# Patient Record
(demographics unavailable — no encounter records)

---

## 2024-12-27 NOTE — HISTORY OF PRESENT ILLNESS
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 \par  \par  He was admitted in Nov with enterococcus bacteremia. He has since stopped self catheterizing. He had BETH while admitted, Cr peaked at 2.3. Pt was mildly anemic in 12/2021, Hgb 10.9. Had labs done with Dr. Emerson which showed monoclonal gammopathy. Hgb has since improved on labs from last month and is normal today. \par  SPEP from 1/2022: One Weak IgG Kappa Band and One Weak IgG Lambda Band Identified. IgG 1072, SFLC ratio 1.96 (4.91/2.51)\par  Pt has mild kidney disease for last 4 years, last Cr 1.41 in 3/2022, which is also improving since hospital stay. Saw Dr. Serrano in 1/2022. Ca wnl\par  \par  He had CT C/A/P done in 11/2021: Degenerative changes of bones. [de-identified] : He is doing well. No new complaints.

## 2024-12-27 NOTE — ASSESSMENT
[FreeTextEntry1] : 82yo M w/ h/o CVA, h/o endocarditis s/p mitral valve repair, BPH s/p TURP here for evaluation of monoclonal gammopathy.  Pt was anemic in the hospital but now no longer anemic -Hgb 15.2 today SPEP from 5/2023 was normal. f/u CMP and MM labs from today Had CTs in the hospital without any krystle lesions noted All questions answered RTC 1 yr or as needed

## 2024-12-27 NOTE — HISTORY OF PRESENT ILLNESS
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 \par  \par  He was admitted in Nov with enterococcus bacteremia. He has since stopped self catheterizing. He had BETH while admitted, Cr peaked at 2.3. Pt was mildly anemic in 12/2021, Hgb 10.9. Had labs done with Dr. Emerson which showed monoclonal gammopathy. Hgb has since improved on labs from last month and is normal today. \par  SPEP from 1/2022: One Weak IgG Kappa Band and One Weak IgG Lambda Band Identified. IgG 1072, SFLC ratio 1.96 (4.91/2.51)\par  Pt has mild kidney disease for last 4 years, last Cr 1.41 in 3/2022, which is also improving since hospital stay. Saw Dr. Serrano in 1/2022. Ca wnl\par  \par  He had CT C/A/P done in 11/2021: Degenerative changes of bones. [de-identified] : He is doing well. No new complaints.

## 2024-12-27 NOTE — ASSESSMENT
[FreeTextEntry1] : 80yo M w/ h/o CVA, h/o endocarditis s/p mitral valve repair, BPH s/p TURP here for evaluation of monoclonal gammopathy.  Pt was anemic in the hospital but now no longer anemic -Hgb 15.2 today SPEP from 5/2023 was normal. f/u CMP and MM labs from today Had CTs in the hospital without any krystle lesions noted All questions answered RTC 1 yr or as needed

## 2024-12-31 NOTE — PHYSICAL EXAM
[Normal Voice/Communication] : normal voice/communication [Normal Outer Ear/Nose] : the outer ears and nose were normal in appearance [Normal Oropharynx] : the oropharynx was normal [Normal TMs] : both tympanic membranes were normal [Normal] : normal rate, regular rhythm, normal S1 and S2 and no murmur heard [No Edema] : there was no peripheral edema [Soft] : abdomen soft [Non Tender] : non-tender [Non-distended] : non-distended [No Masses] : no abdominal mass palpated [Normal Bowel Sounds] : normal bowel sounds [de-identified] : No sinus tenderness.

## 2024-12-31 NOTE — ASSESSMENT
[FreeTextEntry1] : Patient has nasal congestion likely from a mild viral syndrome.  Nasal panel sent for flu/covid-19/rsv.  Patient can use Allegra and Flonase for the nasal congestion.  No indication for antibiotics at this time.

## 2024-12-31 NOTE — HISTORY OF PRESENT ILLNESS
[FreeTextEntry8] : Patient is going to [place] in January 2025 for about a month and wishes to be examined before leaving.  His ears felt clogged, and his nose was congested.  There was no phlegm production.  He feels tired.  No fevers, chills.  There is no cough except when wearing a mask.  No chest pain.  There is chronic dyspnea.  No body aches, GI upset, or new rashes.  His spouse saw her pulmonologist yesterday and had a negative flu/covid-19/rsv panel.  He received the influenza vaccination approximately Oct 2024.  He got the [id number] Pfizer COVID-19 vaccine approximately Nov 2024.

## 2025-01-31 NOTE — ASSESSMENT
[FreeTextEntry1] : Patient is recovering from influenza.  He has an occasional dry cough and can use the Mucinex DM or Robitussin DM as needed.  His spouse has COVID-19, and he is staying in a different bedroom and wearing a mask.  Respiratory panel sent today.  If positive for, COVID-19, then ramon can consider Paxlovid.

## 2025-01-31 NOTE — PHYSICAL EXAM
[Normal Voice/Communication] : normal voice/communication [Normal Sclera/Conjunctiva] : normal sclera/conjunctiva [No Lymphadenopathy] : no lymphadenopathy [Normal] : normal rate, regular rhythm, normal S1 and S2 and no murmur heard [No Edema] : there was no peripheral edema [Soft] : abdomen soft [Non Tender] : non-tender [Non-distended] : non-distended [No Masses] : no abdominal mass palpated [Normal Bowel Sounds] : normal bowel sounds [de-identified] : No sinus tenderness.

## 2025-01-31 NOTE — HISTORY OF PRESENT ILLNESS
[de-identified] : Patient was in [place] for a month.  Early last week, he was coughing.  He went to [facility] in [place] (about 7 days ago), and had a positive influenza test and a negative COVID-19 test.  He took Tamiflu.  He still feels tired and sluggish.  His spouse got COVID-19 and also influenza last week.  Patient has been doing more of the shopping because of this.  He is using Robitussin nighttime prn.  He coughs periodically, with no phlegm.  He is sneezing, and his nose runs on and off.  He has no fevers, chills, dyspnea, chest pain.

## 2025-04-01 NOTE — DISCUSSION/SUMMARY
[FreeTextEntry1] : Mr Sheriff has no complaints and looks unchanged.  His exam shows no change in his weight, regular rhythm, normal blood pressure, clear lungs, and a normal cardiac exam.  His EKG shows bifascicular block and is unchanged.  He is stable and doing well.  He will continue on simvastatin 20.  He will follow-up in a year. [EKG obtained to assist in diagnosis and management of assessed problem(s)] : EKG obtained to assist in diagnosis and management of assessed problem(s)

## 2025-04-01 NOTE — HISTORY OF PRESENT ILLNESS
[FreeTextEntry1] : 82-year-old male with a history of hypercholesterolemia, hypertension, mitral valve replacement with a bout of endocarditis, bifascicular block on his EKG.  He was last seen 8/24.  He has no current complaints.  His last LDL cholesterol was 64 on simvastatin 20.

## 2025-04-16 NOTE — PHYSICAL EXAM
[Normal Appearance] : normal appearance [Well Groomed] : well groomed [General Appearance - In No Acute Distress] : no acute distress [Edema] : no peripheral edema [Respiration, Rhythm And Depth] : normal respiratory rhythm and effort [Exaggerated Use Of Accessory Muscles For Inspiration] : no accessory muscle use [Abdomen Soft] : soft [Abdomen Tenderness] : non-tender [Costovertebral Angle Tenderness] : no ~M costovertebral angle tenderness [Urinary Bladder Findings] : the bladder was normal on palpation [Normal Station and Gait] : the gait and station were normal for the patient's age [] : no rash [No Focal Deficits] : no focal deficits [Oriented To Time, Place, And Person] : oriented to person, place, and time [Affect] : the affect was normal [Mood] : the mood was normal [No Palpable Adenopathy] : no palpable adenopathy [Chaperone Present] : A chaperone was present in the examining room during all aspects of the physical examination [FreeTextEntry2] : boogie